# Patient Record
(demographics unavailable — no encounter records)

---

## 2024-10-22 NOTE — DISCUSSION/SUMMARY
[de-identified] :   Chief complaint Neck pain  HPI Patient is a very pleasant 44-year-old here for follow-up of his neck and left arm issues.  I saw him in my previous practice.  He was injured in 2020.  At that time he reported neck pain coming down the left arm.  He had some weakness in his tricep.  He underwent multiple rounds of physical therapy chiropractic care and acupuncture.  He had 2 epidurals the last 1 being in 2021.  He reports that epidural help with some of his symptoms.  He continues to have 6 out of 10 neck pain and left arm symptoms.  Goes into the periscapular region down to the tricep.  He has intermittent numbness in the left arm.  He denies any issues with fine motor activity.  Has no issues with gait or balance or bowel bladder incontinence.  Examination Pt is NAD, AAOX3 skin is intact no palpable stepoff 5/5 motor strength BUE SILT C5-T1 BUE POSITIVE SPURLING Negative celaya normal rapid  test equal reflexes bicep/BR/tricep hand wwp bcr Cervical flexion 60 Extension 50 lateral rotation 60 bilaterally lateral bending 40 degrees bilaterally  Imaging X-rays today demonstrate mild loss of disc height at C5-C6 and C6-C7  Treatment note I discussed at length with the patient nature of the condition.  The patient continues to have neck and left arm pain in the setting of a disc herniation at C6-C7.  His MRI was from 2021.  We will order an updated MRI today.  I will see the patient after the MRI is completed.  He may be a candidate for repeat injections versus surgery.  All the patient's questions were sought answered.